# Patient Record
Sex: MALE | Race: WHITE | Employment: OTHER | URBAN - METROPOLITAN AREA
[De-identification: names, ages, dates, MRNs, and addresses within clinical notes are randomized per-mention and may not be internally consistent; named-entity substitution may affect disease eponyms.]

---

## 2020-01-03 ENCOUNTER — HOSPITAL ENCOUNTER (OUTPATIENT)
Dept: OPERATING ROOM | Age: 65
Setting detail: OUTPATIENT SURGERY
Discharge: HOME | End: 2020-01-03
Payer: MEDICARE

## 2020-01-03 VITALS
RESPIRATION RATE: 16 BRPM | HEART RATE: 98 BPM | SYSTOLIC BLOOD PRESSURE: 160 MMHG | DIASTOLIC BLOOD PRESSURE: 87 MMHG | OXYGEN SATURATION: 96 %

## 2020-01-03 PROCEDURE — 6370000000 HC RX 637 (ALT 250 FOR IP): Performed by: OPHTHALMOLOGY

## 2020-01-03 PROCEDURE — 66761 REVISION OF IRIS: CPT

## 2020-01-03 RX ORDER — IBUPROFEN 800 MG/1
800 TABLET ORAL 3 TIMES DAILY
COMMUNITY

## 2020-01-03 RX ORDER — TETRACAINE HYDROCHLORIDE 5 MG/ML
2 SOLUTION OPHTHALMIC ONCE
Status: COMPLETED | OUTPATIENT
Start: 2020-01-03 | End: 2020-01-03

## 2020-01-03 RX ORDER — ATORVASTATIN CALCIUM 10 MG/1
10 TABLET, FILM COATED ORAL DAILY
COMMUNITY

## 2020-01-03 RX ORDER — PIOGLITAZONEHYDROCHLORIDE 30 MG/1
30 TABLET ORAL DAILY
COMMUNITY

## 2020-01-03 RX ORDER — ALBUTEROL SULFATE 90 UG/1
2 AEROSOL, METERED RESPIRATORY (INHALATION) PRN
COMMUNITY

## 2020-01-03 RX ORDER — BRIMONIDINE TARTRATE 2 MG/ML
1 SOLUTION/ DROPS OPHTHALMIC ONCE
Status: COMPLETED | OUTPATIENT
Start: 2020-01-03 | End: 2020-01-03

## 2020-01-03 RX ORDER — PILOCARPINE HYDROCHLORIDE 10 MG/ML
1 SOLUTION/ DROPS OPHTHALMIC 2 TIMES DAILY
Status: COMPLETED | OUTPATIENT
Start: 2020-01-03 | End: 2020-01-03

## 2020-01-03 RX ORDER — PREDNISOLONE ACETATE 10 MG/ML
2 SUSPENSION/ DROPS OPHTHALMIC ONCE
Status: COMPLETED | OUTPATIENT
Start: 2020-01-03 | End: 2020-01-03

## 2020-01-03 RX ORDER — VALSARTAN 320 MG/1
320 TABLET ORAL DAILY
COMMUNITY

## 2020-01-03 RX ADMIN — BRIMONIDINE TARTRATE 1 DROP: 2 SOLUTION OPHTHALMIC at 11:04

## 2020-01-03 RX ADMIN — TETRACAINE HYDROCHLORIDE 2 DROP: 5 SOLUTION OPHTHALMIC at 11:04

## 2020-01-03 RX ADMIN — PILOCARPINE HYDROCHLORIDE 1 DROP: 10 SOLUTION/ DROPS OPHTHALMIC at 09:42

## 2020-01-03 RX ADMIN — PILOCARPINE HYDROCHLORIDE 1 DROP: 10 SOLUTION/ DROPS OPHTHALMIC at 09:49

## 2020-01-03 RX ADMIN — PREDNISOLONE ACETATE 2 DROP: 10 SUSPENSION/ DROPS OPHTHALMIC at 11:09

## 2020-01-03 NOTE — OP NOTE
Katty Yañez    OPERATIVE NOTE    Preoperative Diagnosis: Anatomical narrow angle, the right eye    Postoperative Diagnosis: Anatomical narrow angle, the right eye    Procedure: Laser peripheral iridotomy, the right eye    Surgeon: Smita Ireland    Anesthesia: Topical    Complications: none    Specimens: none    Estimated blood loss: less than 1 ml    Indications for procedure: The patient is a 60y.o. year old with findings consistent with narrow and occludable anterior chamber angles as indicated by gonioscopy. Risks, benefits, and alternatives to surgery were discussed with the patient and the patient elected to proceed with laser peripheral iridotomy of the the right eye to relieve the relative pupillary block and prevent acute angle closure glaucoma. .    Details of the procedure: The patient was brought the laser room. The patient had topical proparacaine and brimonidine 0.2% drops instilled. The iridectomy lens was placed on the eye. The patient was then moved to the YAG laser where 18 shots were administered at 2.6 millijoules for a total power of 46.9 millijoules and the iridotomy was completed at 4 o'clock. The patient tolerated the procedure well. One drop of prednisolone acetate 1% was placed on the eye. The IOP was 17 mmHg by tonopen at 5%, 20 minutes after the procedure. The patient will follow up as an outpatient as scheduled.     Smita Ireland

## 2020-01-10 ENCOUNTER — HOSPITAL ENCOUNTER (OUTPATIENT)
Dept: OPERATING ROOM | Age: 65
Setting detail: OUTPATIENT SURGERY
Discharge: HOME | End: 2020-01-10
Payer: MEDICARE

## 2020-01-10 VITALS — SYSTOLIC BLOOD PRESSURE: 166 MMHG | HEART RATE: 84 BPM | DIASTOLIC BLOOD PRESSURE: 92 MMHG

## 2020-01-10 PROCEDURE — 6370000000 HC RX 637 (ALT 250 FOR IP): Performed by: OPHTHALMOLOGY

## 2020-01-10 PROCEDURE — 66761 REVISION OF IRIS: CPT

## 2020-01-10 RX ORDER — BRIMONIDINE TARTRATE 2 MG/ML
1 SOLUTION/ DROPS OPHTHALMIC ONCE
Status: COMPLETED | OUTPATIENT
Start: 2020-01-10 | End: 2020-01-10

## 2020-01-10 RX ORDER — PREDNISOLONE ACETATE 10 MG/ML
2 SUSPENSION/ DROPS OPHTHALMIC ONCE
Status: DISCONTINUED | OUTPATIENT
Start: 2020-01-10 | End: 2020-01-11 | Stop reason: HOSPADM

## 2020-01-10 RX ORDER — TETRACAINE HYDROCHLORIDE 5 MG/ML
2 SOLUTION OPHTHALMIC ONCE
Status: COMPLETED | OUTPATIENT
Start: 2020-01-10 | End: 2020-01-10

## 2020-01-10 RX ORDER — PILOCARPINE HYDROCHLORIDE 10 MG/ML
1 SOLUTION/ DROPS OPHTHALMIC 2 TIMES DAILY
Status: COMPLETED | OUTPATIENT
Start: 2020-01-10 | End: 2020-01-10

## 2020-01-10 RX ADMIN — PILOCARPINE HYDROCHLORIDE 1 DROP: 10 SOLUTION/ DROPS OPHTHALMIC at 09:28

## 2020-01-10 RX ADMIN — BRIMONIDINE TARTRATE 1 DROP: 2 SOLUTION OPHTHALMIC at 09:28

## 2020-01-10 RX ADMIN — TETRACAINE HYDROCHLORIDE 2 DROP: 5 SOLUTION OPHTHALMIC at 11:26

## 2020-01-10 RX ADMIN — PILOCARPINE HYDROCHLORIDE 1 DROP: 10 SOLUTION/ DROPS OPHTHALMIC at 09:35

## 2020-01-10 NOTE — OP NOTE
Reema Hawthorne     OPERATIVE NOTE     Preoperative Diagnosis: Anatomical narrow angle, the left eye     Postoperative Diagnosis: Anatomical narrow angle, the left eye     Procedure: Laser peripheral iridotomy, the left eye     Surgeon: Katina Campos     Anesthesia: Topical     Complications: none     Specimens: none    Estimated blood loss: none     Indications for procedure: The patient is a 60y.o. year old with findings consistent with narrow and occludable anterior chamber angles as indicated by gonioscopy. Risks, benefits, and alternatives to surgery were discussed with the patient and the patient elected to proceed with laser peripheral iridotomy of the the left eye to relieve the relative pupillary block and prevent acute angle closure glaucoma. .     Details of the procedure: The patient was brought the laser room. The patient had topical proparacaine and brimonidine 0.2% drops instilled. The iridectomy lens was placed on the eye. The patient was then moved to the YAG laser where 24 shots were administered at 2.4 millijoules for a total power of 58.0 millijoules and the iridotomy was completed at 4 o'clock. The patient tolerated the procedure well. One drop of prednisolone acetate 1% was placed on the eye. The IOP was 18 mmHg by tonopen at 5%, 20 minutes after the procedure. The patient will follow up as an outpatient as scheduled.

## 2020-01-27 ENCOUNTER — HOSPITAL ENCOUNTER (OUTPATIENT)
Dept: OPERATING ROOM | Age: 65
Setting detail: OUTPATIENT SURGERY
Discharge: HOME | End: 2020-01-27
Payer: MEDICARE

## 2020-01-27 VITALS — DIASTOLIC BLOOD PRESSURE: 91 MMHG | SYSTOLIC BLOOD PRESSURE: 162 MMHG | HEART RATE: 77 BPM

## 2020-01-27 PROCEDURE — 66761 REVISION OF IRIS: CPT

## 2020-01-27 PROCEDURE — 6370000000 HC RX 637 (ALT 250 FOR IP): Performed by: OPHTHALMOLOGY

## 2020-01-27 RX ORDER — PILOCARPINE HYDROCHLORIDE 10 MG/ML
1 SOLUTION/ DROPS OPHTHALMIC
Status: ACTIVE | OUTPATIENT
Start: 2020-01-27 | End: 2020-01-27

## 2020-01-27 RX ORDER — BRIMONIDINE TARTRATE 2 MG/ML
1 SOLUTION/ DROPS OPHTHALMIC ONCE
Status: COMPLETED | OUTPATIENT
Start: 2020-01-27 | End: 2020-01-27

## 2020-01-27 RX ORDER — PREDNISOLONE ACETATE 10 MG/ML
2 SUSPENSION/ DROPS OPHTHALMIC ONCE
Status: COMPLETED | OUTPATIENT
Start: 2020-01-27 | End: 2020-01-27

## 2020-01-27 RX ORDER — TETRACAINE HYDROCHLORIDE 5 MG/ML
2 SOLUTION OPHTHALMIC ONCE
Status: COMPLETED | OUTPATIENT
Start: 2020-01-27 | End: 2020-01-27

## 2020-01-27 RX ORDER — PILOCARPINE HYDROCHLORIDE 10 MG/ML
1 SOLUTION/ DROPS OPHTHALMIC 2 TIMES DAILY
Status: DISCONTINUED | OUTPATIENT
Start: 2020-01-27 | End: 2020-01-28 | Stop reason: HOSPADM

## 2020-01-27 RX ADMIN — TETRACAINE HYDROCHLORIDE 2 DROP: 5 SOLUTION OPHTHALMIC at 10:26

## 2020-01-27 RX ADMIN — PILOCARPINE HYDROCHLORIDE 1 DROP: 10 SOLUTION/ DROPS OPHTHALMIC at 08:58

## 2020-01-27 RX ADMIN — BRIMONIDINE TARTRATE 1 DROP: 2 SOLUTION OPHTHALMIC at 10:26

## 2020-01-27 RX ADMIN — PILOCARPINE HYDROCHLORIDE 1 DROP: 10 SOLUTION/ DROPS OPHTHALMIC at 08:51

## 2020-01-27 RX ADMIN — PREDNISOLONE ACETATE 2 DROP: 10 SUSPENSION/ DROPS OPHTHALMIC at 10:31
